# Patient Record
Sex: FEMALE | Race: WHITE | Employment: UNEMPLOYED | URBAN - METROPOLITAN AREA
[De-identification: names, ages, dates, MRNs, and addresses within clinical notes are randomized per-mention and may not be internally consistent; named-entity substitution may affect disease eponyms.]

---

## 2024-05-02 ENCOUNTER — HOSPITAL ENCOUNTER (OUTPATIENT)
Dept: WOUND CARE | Age: 64
Discharge: HOME | End: 2024-05-02

## 2024-05-02 VITALS
HEIGHT: 65 IN | RESPIRATION RATE: 18 BRPM | SYSTOLIC BLOOD PRESSURE: 153 MMHG | WEIGHT: 240 LBS | TEMPERATURE: 96.6 F | HEART RATE: 96 BPM | DIASTOLIC BLOOD PRESSURE: 79 MMHG | BODY MASS INDEX: 39.99 KG/M2

## 2024-05-02 DIAGNOSIS — T81.89XD NON-HEALING SURGICAL WOUND, SUBSEQUENT ENCOUNTER: Primary | ICD-10-CM

## 2024-05-02 DIAGNOSIS — E66.01 CLASS 2 SEVERE OBESITY DUE TO EXCESS CALORIES WITH SERIOUS COMORBIDITY AND BODY MASS INDEX (BMI) OF 39.0 TO 39.9 IN ADULT (HCC): ICD-10-CM

## 2024-05-02 DIAGNOSIS — I10 PRIMARY HYPERTENSION: ICD-10-CM

## 2024-05-02 DIAGNOSIS — E03.9 HYPOTHYROIDISM, UNSPECIFIED TYPE: ICD-10-CM

## 2024-05-02 PROBLEM — T81.89XA SURGICAL WOUND, NON HEALING: Status: ACTIVE | Noted: 2024-05-02

## 2024-05-02 PROCEDURE — 87205 SMEAR GRAM STAIN: CPT

## 2024-05-02 PROCEDURE — 11042 DBRDMT SUBQ TIS 1ST 20SQCM/<: CPT

## 2024-05-02 PROCEDURE — 87070 CULTURE OTHR SPECIMN AEROBIC: CPT

## 2024-05-02 PROCEDURE — 87077 CULTURE AEROBIC IDENTIFY: CPT

## 2024-05-02 PROCEDURE — 99213 OFFICE O/P EST LOW 20 MIN: CPT

## 2024-05-02 RX ORDER — VALSARTAN 320 MG/1
320 TABLET ORAL DAILY
COMMUNITY

## 2024-05-02 RX ORDER — SODIUM CHLOR/HYPOCHLOROUS ACID 0.033 %
SOLUTION, IRRIGATION IRRIGATION ONCE
OUTPATIENT
Start: 2024-05-02 | End: 2024-05-02

## 2024-05-02 RX ORDER — CLOBETASOL PROPIONATE 0.5 MG/G
OINTMENT TOPICAL ONCE
OUTPATIENT
Start: 2024-05-02 | End: 2024-05-02

## 2024-05-02 RX ORDER — IBUPROFEN 200 MG
TABLET ORAL ONCE
OUTPATIENT
Start: 2024-05-02 | End: 2024-05-02

## 2024-05-02 RX ORDER — GENTAMICIN SULFATE 1 MG/G
OINTMENT TOPICAL ONCE
OUTPATIENT
Start: 2024-05-02 | End: 2024-05-02

## 2024-05-02 RX ORDER — LIDOCAINE HYDROCHLORIDE 40 MG/ML
SOLUTION TOPICAL ONCE
OUTPATIENT
Start: 2024-05-02 | End: 2024-05-02

## 2024-05-02 RX ORDER — LIDOCAINE 50 MG/G
OINTMENT TOPICAL ONCE
OUTPATIENT
Start: 2024-05-02 | End: 2024-05-02

## 2024-05-02 RX ORDER — ASPIRIN 325 MG
325 TABLET ORAL DAILY
COMMUNITY

## 2024-05-02 RX ORDER — FAMOTIDINE 20 MG/1
20 TABLET, FILM COATED ORAL DAILY
COMMUNITY

## 2024-05-02 RX ORDER — LIDOCAINE 40 MG/G
CREAM TOPICAL ONCE
OUTPATIENT
Start: 2024-05-02 | End: 2024-05-02

## 2024-05-02 RX ORDER — TRIAMCINOLONE ACETONIDE 1 MG/G
OINTMENT TOPICAL ONCE
OUTPATIENT
Start: 2024-05-02 | End: 2024-05-02

## 2024-05-02 RX ORDER — LEVOTHYROXINE SODIUM 175 UG/1
175 TABLET ORAL DAILY
COMMUNITY

## 2024-05-02 RX ORDER — BETAMETHASONE DIPROPIONATE 0.5 MG/G
CREAM TOPICAL ONCE
OUTPATIENT
Start: 2024-05-02 | End: 2024-05-02

## 2024-05-02 RX ORDER — GINSENG 100 MG
CAPSULE ORAL ONCE
OUTPATIENT
Start: 2024-05-02 | End: 2024-05-02

## 2024-05-02 RX ORDER — BACITRACIN ZINC AND POLYMYXIN B SULFATE 500; 1000 [USP'U]/G; [USP'U]/G
OINTMENT TOPICAL ONCE
OUTPATIENT
Start: 2024-05-02 | End: 2024-05-02

## 2024-05-02 RX ORDER — CEPHALEXIN 500 MG/1
500 CAPSULE ORAL 4 TIMES DAILY
COMMUNITY

## 2024-05-02 RX ORDER — OMEPRAZOLE 20 MG/1
40 CAPSULE, DELAYED RELEASE ORAL DAILY
COMMUNITY

## 2024-05-02 NOTE — DISCHARGE INSTRUCTIONS
Visit Discharge/Physician Orders    Discharge condition: Stable  Assessment of pain at discharge: yes  Anesthetic used: lidocaine 4%  Discharge to: Home  Left via:Private automobile  Accompanied by: accompanied by aunt  ECF/HHA: [facility] -note new orders 5/2- please order one large excel SAP to cover both wounds for daily changes     Dressing Orders: Cleanse wound to Left hip and left thigh with normal saline, apply ALGINATE AG rope to wound bed and cover with EXCEL SAP, change daily.    Treatment Orders: 5/2 Culture taken  EAT DIET WITH PROTEINS AND VITAMIN C  TAKE A MULTIVITAMIN DAILY IF NOT CONTRAINDICATED      [facility] followup visit ______1 week_______________________  (Please note your next appointment above and if you are unable to keep, kindly give a 24 hour notice. Thank you.)    Physician signature:__________________________      If you experience any of the following, please call the Wound Care Center during business hours:    * Increase in Pain  * Temperature over 101  * Increase in drainage from your wound  * Drainage with a foul odor  * Bleeding  * Increase in swelling  * Need for compression bandage changes due to slippage, breakthrough drainage.    If you need medical attention outside of the business hours of the Wound Care Centers please contact your PCP or go to the nearest emergency room.

## 2024-05-02 NOTE — PROGRESS NOTES
Wound Healing Center /Hyperbarics   History and Physical/Consultation  General Surgery/Medicine    Referring Physician : [place], Jayesh FERGUSON MD  Jessica Shea  MEDICAL RECORD NUMBER:  [id number]  AGE: 64 y.o.   GENDER: female  : 1960  EPISODE DATE:  2024  Subjective:     Chief Complaint   Patient presents with    Wound Check     Left hip         HISTORY of PRESENT ILLNESS HPI     Jessica Shea is a 64 y.o. female who presents today for wound/ulcer evaluation.   History of Wound Context:  The patient has had a wound of nonhealing surgical wound which was first noted approximately 2024.  This has been treated jamar. On their initial visit to the wound healing center, 2024 ,  the patient has noted that the wound has not been improving.  The patient has not had similar previous wounds in the past.      Pt is on abx at time of initial visit.      2024  -counseled on diet, wound care   -all ?s answered  -full wt bearing, no issue according to pt, doing well in pt  -debridement, slough necrotic tissue. No apparent biofilm cx was sent  -alginate ag, no improvement this next week jamar (if home health done with pt at home) or vac  -no cardinal ss  -no constitutional ss      Wound/Ulcer Pain Timing/Severity: intermittent, waxing and waning  Quality of pain: throbbing  Severity:  mild  Modifying Factors: ambulating  Associated Signs/Symptoms: drainage and pain    Ulcer Identification:  Ulcer Type: non-healing surgical  Contributing Factors: decreased mobility and obesity        PAST MEDICAL HISTORY      Diagnosis Date    Hypertension      Past Surgical History:   Procedure Laterality Date    BACK SURGERY      BREAST SURGERY      JOINT REPLACEMENT      TONSILLECTOMY       History reviewed. No pertinent family history.  Social History     Tobacco Use    Smoking status: Never    Smokeless tobacco: Never   Substance Use Topics    Alcohol use: Not Currently    Drug use: Not Currently     Allergies

## 2024-05-05 LAB
MICROORGANISM SPEC CULT: ABNORMAL
MICROORGANISM SPEC CULT: ABNORMAL
MICROORGANISM/AGENT SPEC: ABNORMAL
SPECIMEN DESCRIPTION: ABNORMAL

## 2024-05-06 NOTE — DISCHARGE INSTRUCTIONS
Visit Discharge/Physician Orders     Discharge condition: Stable  Assessment of pain at discharge: yes  Anesthetic used: lidocaine 4%  Discharge to: Home  Left via:Private automobile  Accompanied by: accompanied by aunt  ECF/HHA: [facility] -note new orders 5/2- please order one large excel SAP to cover both wounds for daily changes      Dressing Orders: Cleanse wound to Left hip and left thigh with normal saline, apply ALGINATE AG rope to wound bed and cover with EXCEL SAP, change daily.    Approval for wound vac 5/9/24     When available - WHEN AVAILABLE: CLEANSE WOUND WITH NORMAL STERILE SALINE.  APPLY WOUND VAC FOLLOWING  MANUFACTURE GUIDELINES USING BLACK FOAM AND DRAPE WITH CONT SUCTION  @125MM for 5 minutes and  50 mm for 2 minutes.DRAPE, SPONGE TO BE CHANGED  THREE TIMES A WEEK .  CANISTER TO BE CHANGED WEEKLY AND PRN   MAY USE SKIN PREP, ADHESIVE REMOVER, THIN DUODERM TO PROTECT PERIWOUND  IF WOUND VAC FAILS, NOTIFY PHYSICIAN AND VAC COMPANY, REMOVE VAC AFTER 2 HOURS  AND APPLY: DRESSING TO ________: CLEANSE  WOUND WITH NORMAL STERILE SALINE, AND PLACE MOIST TO MOIST NSS ZULEIKA DRESSING AND SECURE WITH TAPE. CHANGE DAILY       Treatment Orders:   EAT DIET WITH PROTEINS AND VITAMIN C  TAKE A MULTIVITAMIN DAILY IF NOT CONTRAINDICATED       [facility] followup visit ______1 week_______________________  (Please note your next appointment above and if you are unable to keep, kindly give a 24 hour notice. Thank you.)     Physician signature:__________________________        If you experience any of the following, please call the Wound Care Center during business hours:     * Increase in Pain  * Temperature over 101  * Increase in drainage from your wound  * Drainage with a foul odor  * Bleeding  * Increase in swelling  * Need for compression bandage changes due to slippage, breakthrough drainage.     If you need medical attention outside of the business hours of the Wound Care Centers please contact your PCP or

## 2024-05-07 DIAGNOSIS — T14.8XXA WOUND INFECTION: Primary | ICD-10-CM

## 2024-05-07 DIAGNOSIS — L08.9 WOUND INFECTION: Primary | ICD-10-CM

## 2024-05-07 RX ORDER — SULFAMETHOXAZOLE AND TRIMETHOPRIM 800; 160 MG/1; MG/1
1 TABLET ORAL 2 TIMES DAILY
Qty: 20 TABLET | Refills: 0 | Status: SHIPPED | OUTPATIENT
Start: 2024-05-07 | End: 2024-05-17

## 2024-05-07 NOTE — PROGRESS NOTES
Spoke to patient regarding wound culture results, advised to finish Cephalexin and do not take Bactrim. Patient verbalized understanding.

## 2024-05-09 ENCOUNTER — HOSPITAL ENCOUNTER (OUTPATIENT)
Dept: WOUND CARE | Age: 64
Discharge: HOME | End: 2024-05-09

## 2024-05-09 VITALS
HEART RATE: 87 BPM | DIASTOLIC BLOOD PRESSURE: 74 MMHG | TEMPERATURE: 98.6 F | SYSTOLIC BLOOD PRESSURE: 150 MMHG | RESPIRATION RATE: 18 BRPM

## 2024-05-09 DIAGNOSIS — T81.89XD NON-HEALING SURGICAL WOUND, SUBSEQUENT ENCOUNTER: Primary | ICD-10-CM

## 2024-05-09 PROCEDURE — 11042 DBRDMT SUBQ TIS 1ST 20SQCM/<: CPT | Performed by: SURGERY

## 2024-05-09 PROCEDURE — 11042 DBRDMT SUBQ TIS 1ST 20SQCM/<: CPT

## 2024-05-09 RX ORDER — CLOBETASOL PROPIONATE 0.5 MG/G
OINTMENT TOPICAL ONCE
OUTPATIENT
Start: 2024-05-09 | End: 2024-05-09

## 2024-05-09 RX ORDER — IBUPROFEN 200 MG
TABLET ORAL ONCE
OUTPATIENT
Start: 2024-05-09 | End: 2024-05-09

## 2024-05-09 RX ORDER — GENTAMICIN SULFATE 1 MG/G
OINTMENT TOPICAL ONCE
OUTPATIENT
Start: 2024-05-09 | End: 2024-05-09

## 2024-05-09 RX ORDER — LIDOCAINE HYDROCHLORIDE 40 MG/ML
SOLUTION TOPICAL ONCE
Status: COMPLETED | OUTPATIENT
Start: 2024-05-09 | End: 2024-05-09

## 2024-05-09 RX ORDER — LIDOCAINE 50 MG/G
OINTMENT TOPICAL ONCE
OUTPATIENT
Start: 2024-05-09 | End: 2024-05-09

## 2024-05-09 RX ORDER — LIDOCAINE 40 MG/G
CREAM TOPICAL ONCE
OUTPATIENT
Start: 2024-05-09 | End: 2024-05-09

## 2024-05-09 RX ORDER — BETAMETHASONE DIPROPIONATE 0.5 MG/G
CREAM TOPICAL ONCE
OUTPATIENT
Start: 2024-05-09 | End: 2024-05-09

## 2024-05-09 RX ORDER — GINSENG 100 MG
CAPSULE ORAL ONCE
OUTPATIENT
Start: 2024-05-09 | End: 2024-05-09

## 2024-05-09 RX ORDER — TRIAMCINOLONE ACETONIDE 1 MG/G
OINTMENT TOPICAL ONCE
OUTPATIENT
Start: 2024-05-09 | End: 2024-05-09

## 2024-05-09 RX ORDER — SODIUM CHLOR/HYPOCHLOROUS ACID 0.033 %
SOLUTION, IRRIGATION IRRIGATION ONCE
OUTPATIENT
Start: 2024-05-09 | End: 2024-05-09

## 2024-05-09 RX ORDER — BACITRACIN ZINC AND POLYMYXIN B SULFATE 500; 1000 [USP'U]/G; [USP'U]/G
OINTMENT TOPICAL ONCE
OUTPATIENT
Start: 2024-05-09 | End: 2024-05-09

## 2024-05-09 RX ORDER — CEPHALEXIN 500 MG/1
500 CAPSULE ORAL 4 TIMES DAILY
Qty: 28 CAPSULE | Refills: 0 | Status: SHIPPED | OUTPATIENT
Start: 2024-05-09 | End: 2024-05-16

## 2024-05-09 RX ORDER — LIDOCAINE HYDROCHLORIDE 40 MG/ML
SOLUTION TOPICAL ONCE
OUTPATIENT
Start: 2024-05-09 | End: 2024-05-09

## 2024-05-09 RX ADMIN — LIDOCAINE HYDROCHLORIDE 10 ML: 40 SOLUTION TOPICAL at 09:07

## 2024-05-09 NOTE — PROGRESS NOTES
Wound Healing Center Followup Visit Note    Referring Physician : [place], Jayesh FERGUSON MD  Jessica Shea  MEDICAL RECORD NUMBER:  [id number]  AGE: 64 y.o.   GENDER: female  : 1960  EPISODE DATE:  2024    Subjective:     Chief Complaint   Patient presents with    Wound Check     hip      HISTORY of PRESENT ILLNESS HPI   Jessica Shea is a 64 y.o. female who presents today in regards to follow up evaluation and treatment of wound/ulcer.  That patient's past medical, family and social hx were reviewed and changes were made if present.    History of Wound Context:  The patient has had a wound of nonhealing surgical wound which was first noted approximately 2024.  This has been treated jamar. On their initial visit to the wound healing center, 2024 ,  the patient has noted that the wound has not been improving.  The patient has not had similar previous wounds in the past.      Pt is on abx at time of initial visit.      2024  -counseled on diet, wound care   -all ?s answered  -full wt bearing, no issue according to pt, doing well in pt  -debridement, slough necrotic tissue. No apparent biofilm cx was sent  -alginate ag, no improvement this next week jamar (if home health done with pt at home) or vac  -no cardinal ss  -no constitutional ss    2024  -depth improved but surface area worsened slightly  -home health not packing cranial wound reportedly, it is 1cm in depth with .5mm width by .3mm width after debridement - it is certainly a wound that can/should be packed  -get approval for renasys vac 125mmHg 5 min and 50mmHg 2 min  -cx reviewed she was on appropriate abx, extend the course  -no cardinal ss  -no constitutional ss    Wound/Ulcer Pain Timing/Severity: intermittent, waxing and waning  Quality of pain: throbbing  Severity:  mild  Modifying Factors: ambulating  Associated Signs/Symptoms: drainage and pain    Ulcer Identification:  Ulcer Type: non-healing surgical  Contributing Factors:

## 2024-05-13 NOTE — DISCHARGE INSTRUCTIONS
Visit Discharge/Physician Orders     Discharge condition: Stable  Assessment of pain at discharge: yes  Anesthetic used: lidocaine 4%  Discharge to: Home  Left via:Private automobile  Accompanied by: accompanied by aunt  ECF/HHA: [facility] -note new orders 5/2- please order one large excel SAP to cover both wounds for daily changes      Dressing Orders: Cleanse wound to Left hip and left thigh with normal saline, apply ALGINATE AG rope to wound bed and cover with EXCEL SAP, change daily.     Approval for wound vac 5/9/24      When available - WHEN AVAILABLE: CLEANSE WOUND WITH NORMAL STERILE SALINE.  APPLY WOUND VAC FOLLOWING  MANUFACTURE GUIDELINES USING BLACK FOAM AND DRAPE WITH CONT SUCTION  @125MM for 5 minutes and  50 mm for 2 minutes.DRAPE, SPONGE TO BE CHANGED  THREE TIMES A WEEK .  CANISTER TO BE CHANGED WEEKLY AND PRN   MAY USE SKIN PREP, ADHESIVE REMOVER, THIN DUODERM TO PROTECT PERIWOUND  IF WOUND VAC FAILS, NOTIFY PHYSICIAN AND VAC COMPANY, REMOVE VAC AFTER 2 HOURS  AND APPLY: DRESSING TO left hip: CLEANSE  WOUND WITH NORMAL STERILE SALINE, AND PLACE MOIST TO MOIST NSS ZULEIKA DRESSING AND SECURE WITH TAPE. CHANGE DAILY        Treatment Orders:   EAT DIET WITH PROTEINS AND VITAMIN C  TAKE A MULTIVITAMIN DAILY IF NOT CONTRAINDICATED       [facility] followup visit ______1 week_______________________  (Please note your next appointment above and if you are unable to keep, kindly give a 24 hour notice. Thank you.)     Physician signature:__________________________        If you experience any of the following, please call the Wound Care Center during business hours:     * Increase in Pain  * Temperature over 101  * Increase in drainage from your wound  * Drainage with a foul odor  * Bleeding  * Increase in swelling  * Need for compression bandage changes due to slippage, breakthrough drainage.     If you need medical attention outside of the business hours of the Wound Care Centers please contact your PCP or

## 2024-05-16 ENCOUNTER — HOSPITAL ENCOUNTER (OUTPATIENT)
Dept: WOUND CARE | Age: 64
Discharge: HOME | End: 2024-05-16
Payer: COMMERCIAL

## 2024-05-16 VITALS
DIASTOLIC BLOOD PRESSURE: 68 MMHG | SYSTOLIC BLOOD PRESSURE: 146 MMHG | HEART RATE: 80 BPM | BODY MASS INDEX: 39.99 KG/M2 | RESPIRATION RATE: 20 BRPM | HEIGHT: 65 IN | WEIGHT: 240 LBS | TEMPERATURE: 96.9 F

## 2024-05-16 DIAGNOSIS — T81.89XD NON-HEALING SURGICAL WOUND, SUBSEQUENT ENCOUNTER: Primary | ICD-10-CM

## 2024-05-16 PROCEDURE — 11042 DBRDMT SUBQ TIS 1ST 20SQCM/<: CPT

## 2024-05-16 RX ORDER — IBUPROFEN 200 MG
TABLET ORAL ONCE
OUTPATIENT
Start: 2024-05-16 | End: 2024-05-16

## 2024-05-16 RX ORDER — LIDOCAINE 50 MG/G
OINTMENT TOPICAL ONCE
OUTPATIENT
Start: 2024-05-16 | End: 2024-05-16

## 2024-05-16 RX ORDER — GENTAMICIN SULFATE 1 MG/G
OINTMENT TOPICAL ONCE
OUTPATIENT
Start: 2024-05-16 | End: 2024-05-16

## 2024-05-16 RX ORDER — GINSENG 100 MG
CAPSULE ORAL ONCE
OUTPATIENT
Start: 2024-05-16 | End: 2024-05-16

## 2024-05-16 RX ORDER — LIDOCAINE HYDROCHLORIDE 40 MG/ML
SOLUTION TOPICAL ONCE
OUTPATIENT
Start: 2024-05-16 | End: 2024-05-16

## 2024-05-16 RX ORDER — LIDOCAINE 40 MG/G
CREAM TOPICAL ONCE
OUTPATIENT
Start: 2024-05-16 | End: 2024-05-16

## 2024-05-16 RX ORDER — BETAMETHASONE DIPROPIONATE 0.5 MG/G
CREAM TOPICAL ONCE
OUTPATIENT
Start: 2024-05-16 | End: 2024-05-16

## 2024-05-16 RX ORDER — LIDOCAINE HYDROCHLORIDE 40 MG/ML
SOLUTION TOPICAL ONCE
Status: COMPLETED | OUTPATIENT
Start: 2024-05-16 | End: 2024-05-16

## 2024-05-16 RX ORDER — SODIUM CHLOR/HYPOCHLOROUS ACID 0.033 %
SOLUTION, IRRIGATION IRRIGATION ONCE
OUTPATIENT
Start: 2024-05-16 | End: 2024-05-16

## 2024-05-16 RX ORDER — BACITRACIN ZINC AND POLYMYXIN B SULFATE 500; 1000 [USP'U]/G; [USP'U]/G
OINTMENT TOPICAL ONCE
OUTPATIENT
Start: 2024-05-16 | End: 2024-05-16

## 2024-05-16 RX ORDER — TRIAMCINOLONE ACETONIDE 1 MG/G
OINTMENT TOPICAL ONCE
OUTPATIENT
Start: 2024-05-16 | End: 2024-05-16

## 2024-05-16 RX ORDER — CLOBETASOL PROPIONATE 0.5 MG/G
OINTMENT TOPICAL ONCE
OUTPATIENT
Start: 2024-05-16 | End: 2024-05-16

## 2024-05-16 RX ADMIN — LIDOCAINE HYDROCHLORIDE 5 ML: 40 SOLUTION TOPICAL at 08:19

## 2024-05-16 ASSESSMENT — PAIN SCALES - GENERAL: PAINLEVEL_OUTOF10: 0

## 2024-05-16 NOTE — PROGRESS NOTES
)      Wound/Ulcer #: 2    Percent of Wound/Ulcer Debrided: 100%    Total Surface Area Debrided:  0.06sq cm (from 0.09 sq cm )    Estimated Blood Loss:  Minimal  Hemostasis Achieved:  by pressure    Procedural Pain:  0  / 10   Post Procedural Pain:  0 / 10     Response to treatment:  Well tolerated by patient.     Plan:   Treatment Note please see attached Discharge Instructions    Written patient dismissal instructions given to patient and signed by patient or POA.         Discharge Instructions         Visit Discharge/Physician Orders     Discharge condition: Stable  Assessment of pain at discharge: yes  Anesthetic used: lidocaine 4%  Discharge to: Home  Left via:Private automobile  Accompanied by: accompanied by aunt  ECF/HHA: [facility] -note new orders 5/2- please order one large excel SAP to cover both wounds for daily changes      Dressing Orders: Cleanse wound to Left hip and left thigh with normal saline, apply ALGINATE AG rope to wound bed and cover with EXCEL SAP, change daily.     Approval for wound vac 5/9/24      When available - WHEN AVAILABLE: CLEANSE WOUND WITH NORMAL STERILE SALINE.  APPLY WOUND VAC FOLLOWING  MANUFACTURE GUIDELINES USING BLACK FOAM AND DRAPE WITH CONT SUCTION  @125MM for 5 minutes and  50 mm for 2 minutes.DRAPE, SPONGE TO BE CHANGED  THREE TIMES A WEEK .  CANISTER TO BE CHANGED WEEKLY AND PRN   MAY USE SKIN PREP, ADHESIVE REMOVER, THIN DUODERM TO PROTECT PERIWOUND  IF WOUND VAC FAILS, NOTIFY PHYSICIAN AND VAC COMPANY, REMOVE VAC AFTER 2 HOURS  AND APPLY: DRESSING TO left hip: CLEANSE  WOUND WITH NORMAL STERILE SALINE, AND PLACE MOIST TO MOIST NSS ZULEIKA DRESSING AND SECURE WITH TAPE. CHANGE DAILY        Treatment Orders:   EAT DIET WITH PROTEINS AND VITAMIN C  TAKE A MULTIVITAMIN DAILY IF NOT CONTRAINDICATED       [facility] followup visit ______1 week_______________________  (Please note your next appointment above and if you are unable to keep, kindly give a 24 hour notice. Thank

## 2024-05-20 NOTE — DISCHARGE INSTRUCTIONS
Visit Discharge/Physician Orders     Discharge condition: Stable  Assessment of pain at discharge: yes  Anesthetic used: lidocaine 4%  Discharge to: Home  Left via:Private automobile  Accompanied by: accompanied by aunt  ECF/HHA: [facility] -note new orders 5/2- please order one large excel SAP to cover both wounds for daily changes      Dressing Orders: Cleanse wound to Left hip and left thigh with normal saline, apply ALGINATE AG rope to wound bed and cover with EXCEL SAP, change daily.        When available - WHEN AVAILABLE: CLEANSE WOUND WITH NORMAL STERILE SALINE.  APPLY WOUND VAC FOLLOWING  MANUFACTURE GUIDELINES USING BLACK FOAM AND DRAPE WITH CONT SUCTION  @125MM for 5 minutes and  50 mm for 2 minutes.DRAPE, SPONGE TO BE CHANGED  THREE TIMES A WEEK .  CANISTER TO BE CHANGED WEEKLY AND PRN   MAY USE SKIN PREP, ADHESIVE REMOVER, THIN DUODERM TO PROTECT PERIWOUND  IF WOUND VAC FAILS, NOTIFY PHYSICIAN AND VAC COMPANY, REMOVE VAC AFTER 2 HOURS  AND APPLY: DRESSING TO left hip: CLEANSE  WOUND WITH NORMAL STERILE SALINE, AND PLACE MOIST TO MOIST NSS ZULEIKA DRESSING AND SECURE WITH TAPE. CHANGE DAILY    Make sure to pack the areas and bridge wounds.  Thigh has a depth of 2.1        Treatment Orders:   EAT DIET WITH PROTEINS AND VITAMIN C  TAKE A MULTIVITAMIN DAILY IF NOT CONTRAINDICATED       [facility] followup visit ______1 week_______________________  (Please note your next appointment above and if you are unable to keep, kindly give a 24 hour notice. Thank you.)     Physician signature:__________________________        If you experience any of the following, please call the Wound Care Center during business hours:     * Increase in Pain  * Temperature over 101  * Increase in drainage from your wound  * Drainage with a foul odor  * Bleeding  * Increase in swelling  * Need for compression bandage changes due to slippage, breakthrough drainage.     If you need medical attention outside of the business hours of the

## 2024-05-23 ENCOUNTER — HOSPITAL ENCOUNTER (OUTPATIENT)
Dept: WOUND CARE | Age: 64
Discharge: HOME | End: 2024-05-23
Payer: COMMERCIAL

## 2024-05-23 VITALS
HEART RATE: 78 BPM | HEIGHT: 65 IN | DIASTOLIC BLOOD PRESSURE: 80 MMHG | WEIGHT: 240 LBS | SYSTOLIC BLOOD PRESSURE: 156 MMHG | RESPIRATION RATE: 18 BRPM | BODY MASS INDEX: 39.99 KG/M2 | TEMPERATURE: 97.7 F

## 2024-05-23 DIAGNOSIS — T81.89XD NON-HEALING SURGICAL WOUND, SUBSEQUENT ENCOUNTER: Primary | ICD-10-CM

## 2024-05-23 PROCEDURE — 11042 DBRDMT SUBQ TIS 1ST 20SQCM/<: CPT

## 2024-05-23 RX ORDER — GINSENG 100 MG
CAPSULE ORAL ONCE
OUTPATIENT
Start: 2024-05-23 | End: 2024-05-23

## 2024-05-23 RX ORDER — IBUPROFEN 200 MG
TABLET ORAL ONCE
OUTPATIENT
Start: 2024-05-23 | End: 2024-05-23

## 2024-05-23 RX ORDER — LIDOCAINE 40 MG/G
CREAM TOPICAL ONCE
OUTPATIENT
Start: 2024-05-23 | End: 2024-05-23

## 2024-05-23 RX ORDER — CLOBETASOL PROPIONATE 0.5 MG/G
OINTMENT TOPICAL ONCE
OUTPATIENT
Start: 2024-05-23 | End: 2024-05-23

## 2024-05-23 RX ORDER — LIDOCAINE HYDROCHLORIDE 40 MG/ML
SOLUTION TOPICAL ONCE
Status: COMPLETED | OUTPATIENT
Start: 2024-05-23 | End: 2024-05-23

## 2024-05-23 RX ORDER — GENTAMICIN SULFATE 1 MG/G
OINTMENT TOPICAL ONCE
OUTPATIENT
Start: 2024-05-23 | End: 2024-05-23

## 2024-05-23 RX ORDER — LIDOCAINE 50 MG/G
OINTMENT TOPICAL ONCE
OUTPATIENT
Start: 2024-05-23 | End: 2024-05-23

## 2024-05-23 RX ORDER — BACITRACIN ZINC AND POLYMYXIN B SULFATE 500; 1000 [USP'U]/G; [USP'U]/G
OINTMENT TOPICAL ONCE
OUTPATIENT
Start: 2024-05-23 | End: 2024-05-23

## 2024-05-23 RX ORDER — SODIUM CHLOR/HYPOCHLOROUS ACID 0.033 %
SOLUTION, IRRIGATION IRRIGATION ONCE
OUTPATIENT
Start: 2024-05-23 | End: 2024-05-23

## 2024-05-23 RX ORDER — BETAMETHASONE DIPROPIONATE 0.5 MG/G
CREAM TOPICAL ONCE
OUTPATIENT
Start: 2024-05-23 | End: 2024-05-23

## 2024-05-23 RX ORDER — TRIAMCINOLONE ACETONIDE 1 MG/G
OINTMENT TOPICAL ONCE
OUTPATIENT
Start: 2024-05-23 | End: 2024-05-23

## 2024-05-23 RX ORDER — LIDOCAINE HYDROCHLORIDE 40 MG/ML
SOLUTION TOPICAL ONCE
OUTPATIENT
Start: 2024-05-23 | End: 2024-05-23

## 2024-05-23 RX ADMIN — LIDOCAINE HYDROCHLORIDE 10 ML: 40 SOLUTION TOPICAL at 08:08

## 2024-05-23 ASSESSMENT — PAIN SCALES - GENERAL: PAINLEVEL_OUTOF10: 0

## 2024-05-23 NOTE — PROGRESS NOTES
Wound Healing Center Followup Visit Note    Referring Physician : [place], Jayesh FERGUSON MD  Jessica Shea  MEDICAL RECORD NUMBER:  [id number]  AGE: 64 y.o.   GENDER: female  : 1960  EPISODE DATE:  2024    Subjective:     Chief Complaint   Patient presents with    Wound Check     Left hip      HISTORY of PRESENT ILLNESS HPI   Jessica Shea is a 64 y.o. female who presents today in regards to follow up evaluation and treatment of wound/ulcer.  That patient's past medical, family and social hx were reviewed and changes were made if present.    History of Wound Context:  The patient has had a wound of nonhealing surgical wound which was first noted approximately 2024.  This has been treated jamar. On their initial visit to the wound healing center, 2024 ,  the patient has noted that the wound has not been improving.  The patient has not had similar previous wounds in the past.      Pt is on abx at time of initial visit.      2024  -counseled on diet, wound care   -all ?s answered  -full wt bearing, no issue according to pt, doing well in pt  -debridement, slough necrotic tissue. No apparent biofilm cx was sent  -alginate ag, no improvement this next week jamar (if home health done with pt at home) or vac  -no cardinal ss  -no constitutional ss    2024  -depth improved but surface area worsened slightly  -home health not packing cranial wound reportedly, it is 1cm in depth with .5mm width by .3mm width after debridement - it is certainly a wound that can/should be packed  -get approval for renasys vac 125mmHg 5 min and 50mmHg 2 min  -cx reviewed she was on appropriate abx, extend the course  -no cardinal ss  -no constitutional ss    2024  -overall, cranial and caudal wounds healing nicely  -caudal wound surface area appears worse but depth improved, healthy budding granulation tissue is present  -insurance issue with vac we are working through now  -no cardinal ss  -no constitutional

## 2024-05-28 NOTE — DISCHARGE INSTRUCTIONS
Visit Discharge/Physician Orders     Discharge condition: Stable  Assessment of pain at discharge: yes  Anesthetic used: lidocaine 4%  Discharge to: Home  Left via:Private automobile  Accompanied by: accompanied by aunt  ECF/HHA: [facility] -note new orders 5/2- please order one large excel SAP to cover both wounds for daily changes      Dressing Orders: Cleanse wound to Left hip and left thigh with normal saline, apply ALGINATE AG rope to wound bed and cover with EXCEL SAP, change daily.        When available - WHEN AVAILABLE: CLEANSE WOUND WITH NORMAL STERILE SALINE.  APPLY WOUND VAC FOLLOWING  MANUFACTURE GUIDELINES USING BLACK FOAM AND DRAPE WITH CONT SUCTION  @125MM for 5 minutes and  50 mm for 2 minutes.DRAPE, SPONGE TO BE CHANGED  THREE TIMES A WEEK .  CANISTER TO BE CHANGED WEEKLY AND PRN   MAY USE SKIN PREP, ADHESIVE REMOVER, THIN DUODERM TO PROTECT PERIWOUND  IF WOUND VAC FAILS, NOTIFY PHYSICIAN AND VAC COMPANY, REMOVE VAC AFTER 2 HOURS  AND APPLY: DRESSING TO left hip: CLEANSE  WOUND WITH NORMAL STERILE SALINE, AND PLACE MOIST TO MOIST NSS ZULEIKA DRESSING AND SECURE WITH TAPE. CHANGE DAILY     Make sure to pack the areas and bridge wounds.  Thigh has a depth of 2.1        Treatment Orders:   EAT DIET WITH PROTEINS AND VITAMIN C  TAKE A MULTIVITAMIN DAILY IF NOT CONTRAINDICATED       [facility] followup visit ______1 week_______________________  (Please note your next appointment above and if you are unable to keep, kindly give a 24 hour notice. Thank you.)     Physician signature:__________________________        If you experience any of the following, please call the Wound Care Center during business hours:     * Increase in Pain  * Temperature over 101  * Increase in drainage from your wound  * Drainage with a foul odor  * Bleeding  * Increase in swelling  * Need for compression bandage changes due to slippage, breakthrough drainage.     If you need medical attention outside of the business hours

## 2024-05-30 ENCOUNTER — HOSPITAL ENCOUNTER (OUTPATIENT)
Dept: WOUND CARE | Age: 64
Discharge: HOME | End: 2024-05-30
Payer: COMMERCIAL

## 2024-05-30 VITALS
RESPIRATION RATE: 18 BRPM | HEART RATE: 89 BPM | SYSTOLIC BLOOD PRESSURE: 145 MMHG | TEMPERATURE: 97 F | DIASTOLIC BLOOD PRESSURE: 88 MMHG

## 2024-05-30 DIAGNOSIS — T81.89XD NON-HEALING SURGICAL WOUND, SUBSEQUENT ENCOUNTER: Primary | ICD-10-CM

## 2024-05-30 PROCEDURE — 11042 DBRDMT SUBQ TIS 1ST 20SQCM/<: CPT | Performed by: SURGERY

## 2024-05-30 PROCEDURE — 11042 DBRDMT SUBQ TIS 1ST 20SQCM/<: CPT

## 2024-05-30 RX ORDER — TRIAMCINOLONE ACETONIDE 1 MG/G
OINTMENT TOPICAL ONCE
OUTPATIENT
Start: 2024-05-30 | End: 2024-05-30

## 2024-05-30 RX ORDER — LIDOCAINE 50 MG/G
OINTMENT TOPICAL ONCE
OUTPATIENT
Start: 2024-05-30 | End: 2024-05-30

## 2024-05-30 RX ORDER — IBUPROFEN 200 MG
TABLET ORAL ONCE
OUTPATIENT
Start: 2024-05-30 | End: 2024-05-30

## 2024-05-30 RX ORDER — CLOBETASOL PROPIONATE 0.5 MG/G
OINTMENT TOPICAL ONCE
OUTPATIENT
Start: 2024-05-30 | End: 2024-05-30

## 2024-05-30 RX ORDER — BETAMETHASONE DIPROPIONATE 0.5 MG/G
CREAM TOPICAL ONCE
OUTPATIENT
Start: 2024-05-30 | End: 2024-05-30

## 2024-05-30 RX ORDER — LIDOCAINE 40 MG/G
CREAM TOPICAL ONCE
OUTPATIENT
Start: 2024-05-30 | End: 2024-05-30

## 2024-05-30 RX ORDER — SODIUM CHLOR/HYPOCHLOROUS ACID 0.033 %
SOLUTION, IRRIGATION IRRIGATION ONCE
OUTPATIENT
Start: 2024-05-30 | End: 2024-05-30

## 2024-05-30 RX ORDER — M-VIT,TX,IRON,MINS/CALC/FOLIC 27MG-0.4MG
1 TABLET ORAL DAILY
COMMUNITY

## 2024-05-30 RX ORDER — GENTAMICIN SULFATE 1 MG/G
OINTMENT TOPICAL ONCE
OUTPATIENT
Start: 2024-05-30 | End: 2024-05-30

## 2024-05-30 RX ORDER — LIDOCAINE HYDROCHLORIDE 40 MG/ML
SOLUTION TOPICAL ONCE
Status: COMPLETED | OUTPATIENT
Start: 2024-05-30 | End: 2024-05-30

## 2024-05-30 RX ORDER — BACITRACIN ZINC AND POLYMYXIN B SULFATE 500; 1000 [USP'U]/G; [USP'U]/G
OINTMENT TOPICAL ONCE
OUTPATIENT
Start: 2024-05-30 | End: 2024-05-30

## 2024-05-30 RX ORDER — GINSENG 100 MG
CAPSULE ORAL ONCE
OUTPATIENT
Start: 2024-05-30 | End: 2024-05-30

## 2024-05-30 RX ORDER — LIDOCAINE HYDROCHLORIDE 40 MG/ML
SOLUTION TOPICAL ONCE
OUTPATIENT
Start: 2024-05-30 | End: 2024-05-30

## 2024-05-30 RX ADMIN — LIDOCAINE HYDROCHLORIDE 10 ML: 40 SOLUTION TOPICAL at 08:15

## 2024-05-30 NOTE — PROGRESS NOTES
Wound Healing Center Followup Visit Note    Referring Physician : [place], Jayesh FERGUSON MD  Jessica Shea  MEDICAL RECORD NUMBER:  [id number]  AGE: 64 y.o.   GENDER: female  : 1960  EPISODE DATE:  2024    Subjective:     Chief Complaint   Patient presents with    Wound Check     Left thigh and left hip wound      HISTORY of PRESENT ILLNESS HPI   Jessica Shea is a 64 y.o. female who presents today in regards to follow up evaluation and treatment of wound/ulcer.  That patient's past medical, family and social hx were reviewed and changes were made if present.    History of Wound Context:  The patient has had a wound of nonhealing surgical wound which was first noted approximately 2024.  This has been treated jamar. On their initial visit to the wound healing center, 2024 ,  the patient has noted that the wound has not been improving.  The patient has not had similar previous wounds in the past.      Pt is on abx at time of initial visit.      2024  -counseled on diet, wound care   -all ?s answered  -full wt bearing, no issue according to pt, doing well in pt  -debridement, slough necrotic tissue. No apparent biofilm cx was sent  -alginate ag, no improvement this next week jamar (if home health done with pt at home) or vac  -no cardinal ss  -no constitutional ss    2024  -depth improved but surface area worsened slightly  -home health not packing cranial wound reportedly, it is 1cm in depth with .5mm width by .3mm width after debridement - it is certainly a wound that can/should be packed  -get approval for renasys vac 125mmHg 5 min and 50mmHg 2 min  -cx reviewed she was on appropriate abx, extend the course  -no cardinal ss  -no constitutional ss    2024  -overall, cranial and caudal wounds healing nicely  -caudal wound surface area appears worse but depth improved, healthy budding granulation tissue is present  -insurance issue with vac we are working through now  -no cardinal

## 2024-06-04 NOTE — DISCHARGE INSTRUCTIONS
Visit Discharge/Physician Orders     Discharge condition: Stable  Assessment of pain at discharge: yes  Anesthetic used: lidocaine 4%  Discharge to: Home  Left via:Private automobile  Accompanied by: accompanied by aunt  ECF/HHA: [facility]     Dressing Orders: IN CLINIC _ Cleanse wound to Left hip and left thigh with normal saline, apply ALGINATE AG rope to wound bed and cover with EXCEL SAP, change daily.        Home Health : CLEANSE WOUND WITH NORMAL STERILE SALINE.  APPLY WOUND VAC FOLLOWING  MANUFACTURE GUIDELINES USING BLACK FOAM AND DRAPE WITH CONT SUCTION  @125MM for 5 minutes and  50 mm for 2 minutes.DRAPE, SPONGE TO BE CHANGED  THREE TIMES A WEEK .  CANISTER TO BE CHANGED WEEKLY AND PRN   MAY USE SKIN PREP, ADHESIVE REMOVER, THIN DUODERM TO PROTECT PERIWOUND  IF WOUND VAC FAILS, NOTIFY PHYSICIAN AND VAC COMPANY, REMOVE VAC AFTER 2 HOURS  AND APPLY: DRESSING TO left hip: CLEANSE  WOUND WITH NORMAL STERILE SALINE, AND PLACE MOIST TO MOIST NSS ZULEIKA DRESSING AND SECURE WITH TAPE. CHANGE DAILY     Make sure to pack the areas and bridge wounds.  Thigh has a depth of  0.9        Treatment Orders:   EAT DIET WITH PROTEINS AND VITAMIN C  TAKE A MULTIVITAMIN DAILY IF NOT CONTRAINDICATED       [facility] followup visit ______1 week_______________________  (Please note your next appointment above and if you are unable to keep, kindly give a 24 hour notice. Thank you.)     Physician signature:__________________________        If you experience any of the following, please call the Wound Care Center during business hours:     * Increase in Pain  * Temperature over 101  * Increase in drainage from your wound  * Drainage with a foul odor  * Bleeding  * Increase in swelling  * Need for compression bandage changes due to slippage, breakthrough drainage.     If you need medical attention outside of the business hours of the Wound Care Centers please contact your PCP or go to the nearest emergency room.

## 2024-06-06 ENCOUNTER — HOSPITAL ENCOUNTER (OUTPATIENT)
Dept: WOUND CARE | Age: 64
Discharge: HOME | End: 2024-06-06
Payer: COMMERCIAL

## 2024-06-06 VITALS
HEIGHT: 65 IN | TEMPERATURE: 97 F | HEART RATE: 84 BPM | DIASTOLIC BLOOD PRESSURE: 66 MMHG | WEIGHT: 240 LBS | RESPIRATION RATE: 18 BRPM | SYSTOLIC BLOOD PRESSURE: 148 MMHG | BODY MASS INDEX: 39.99 KG/M2

## 2024-06-06 DIAGNOSIS — T81.89XD NON-HEALING SURGICAL WOUND, SUBSEQUENT ENCOUNTER: Primary | ICD-10-CM

## 2024-06-06 PROCEDURE — 11042 DBRDMT SUBQ TIS 1ST 20SQCM/<: CPT

## 2024-06-06 RX ORDER — TRIAMCINOLONE ACETONIDE 1 MG/G
OINTMENT TOPICAL ONCE
OUTPATIENT
Start: 2024-06-06 | End: 2024-06-06

## 2024-06-06 RX ORDER — LIDOCAINE HYDROCHLORIDE 40 MG/ML
SOLUTION TOPICAL ONCE
OUTPATIENT
Start: 2024-06-06 | End: 2024-06-06

## 2024-06-06 RX ORDER — GINSENG 100 MG
CAPSULE ORAL ONCE
OUTPATIENT
Start: 2024-06-06 | End: 2024-06-06

## 2024-06-06 RX ORDER — SODIUM CHLOR/HYPOCHLOROUS ACID 0.033 %
SOLUTION, IRRIGATION IRRIGATION ONCE
OUTPATIENT
Start: 2024-06-06 | End: 2024-06-06

## 2024-06-06 RX ORDER — CLOBETASOL PROPIONATE 0.5 MG/G
OINTMENT TOPICAL ONCE
OUTPATIENT
Start: 2024-06-06 | End: 2024-06-06

## 2024-06-06 RX ORDER — IBUPROFEN 200 MG
TABLET ORAL ONCE
OUTPATIENT
Start: 2024-06-06 | End: 2024-06-06

## 2024-06-06 RX ORDER — GENTAMICIN SULFATE 1 MG/G
OINTMENT TOPICAL ONCE
OUTPATIENT
Start: 2024-06-06 | End: 2024-06-06

## 2024-06-06 RX ORDER — LIDOCAINE HYDROCHLORIDE 40 MG/ML
SOLUTION TOPICAL ONCE
Status: COMPLETED | OUTPATIENT
Start: 2024-06-06 | End: 2024-06-06

## 2024-06-06 RX ORDER — LIDOCAINE 50 MG/G
OINTMENT TOPICAL ONCE
OUTPATIENT
Start: 2024-06-06 | End: 2024-06-06

## 2024-06-06 RX ORDER — LIDOCAINE 40 MG/G
CREAM TOPICAL ONCE
OUTPATIENT
Start: 2024-06-06 | End: 2024-06-06

## 2024-06-06 RX ORDER — BETAMETHASONE DIPROPIONATE 0.5 MG/G
CREAM TOPICAL ONCE
OUTPATIENT
Start: 2024-06-06 | End: 2024-06-06

## 2024-06-06 RX ORDER — BACITRACIN ZINC AND POLYMYXIN B SULFATE 500; 1000 [USP'U]/G; [USP'U]/G
OINTMENT TOPICAL ONCE
OUTPATIENT
Start: 2024-06-06 | End: 2024-06-06

## 2024-06-06 RX ADMIN — LIDOCAINE HYDROCHLORIDE 10 ML: 40 SOLUTION TOPICAL at 08:27

## 2024-06-06 NOTE — PROGRESS NOTES
Wound Healing Center Followup Visit Note    Referring Physician : [place], Jayesh FERGUSON MD  Jessica Shea  MEDICAL RECORD NUMBER:  [id number]  AGE: 64 y.o.   GENDER: female  : 1960  EPISODE DATE:  2024    Subjective:     Chief Complaint   Patient presents with    Wound Check     Left hip and thigh      HISTORY of PRESENT ILLNESS HPI   Jessica Shea is a 64 y.o. female who presents today in regards to follow up evaluation and treatment of wound/ulcer.  That patient's past medical, family and social hx were reviewed and changes were made if present.    History of Wound Context:  The patient has had a wound of nonhealing surgical wound which was first noted approximately 2024.  This has been treated jamar. On their initial visit to the wound healing center, 2024 ,  the patient has noted that the wound has not been improving.  The patient has not had similar previous wounds in the past.      Pt is on abx at time of initial visit.      2024  -counseled on diet, wound care   -all ?s answered  -full wt bearing, no issue according to pt, doing well in pt  -debridement, slough necrotic tissue. No apparent biofilm cx was sent  -alginate ag, no improvement this next week jamar (if home health done with pt at home) or vac  -no cardinal ss  -no constitutional ss    2024  -depth improved but surface area worsened slightly  -home health not packing cranial wound reportedly, it is 1cm in depth with .5mm width by .3mm width after debridement - it is certainly a wound that can/should be packed  -get approval for renasys vac 125mmHg 5 min and 50mmHg 2 min  -cx reviewed she was on appropriate abx, extend the course  -no cardinal ss  -no constitutional ss    2024  -overall, cranial and caudal wounds healing nicely  -caudal wound surface area appears worse but depth improved, healthy budding granulation tissue is present  -insurance issue with vac we are working through now  -no cardinal ss  -no

## 2024-06-10 NOTE — DISCHARGE INSTRUCTIONS
Visit Discharge/Physician Orders     Discharge condition: Stable  Assessment of pain at discharge: yes  Anesthetic used: lidocaine 4%  Discharge to: Home  Left via:Private automobile  Accompanied by: accompanied by aunt  ECF/HHA: [facility] - NEW ORDERS     Dressing Orders:  Cleanse wound to Left hip and left thigh with normal saline, apply ALGINATE AG  to wound bed and cover with EXCEL SAP, change daily.           Treatment Orders:   EAT DIET WITH PROTEINS AND VITAMIN C  TAKE A MULTIVITAMIN DAILY IF NOT CONTRAINDICATED       [facility] followup visit ______1 week_______________________  (Please note your next appointment above and if you are unable to keep, kindly give a 24 hour notice. Thank you.)     Physician signature:__________________________        If you experience any of the following, please call the Wound Care Center during business hours:     * Increase in Pain  * Temperature over 101  * Increase in drainage from your wound  * Drainage with a foul odor  * Bleeding  * Increase in swelling  * Need for compression bandage changes due to slippage, breakthrough drainage.     If you need medical attention outside of the business hours of the Wound Care Centers please contact your PCP or go to the nearest emergency room.

## 2024-06-13 ENCOUNTER — HOSPITAL ENCOUNTER (OUTPATIENT)
Dept: WOUND CARE | Age: 64
Discharge: HOME | End: 2024-06-13
Payer: COMMERCIAL

## 2024-06-13 VITALS
TEMPERATURE: 95.2 F | HEIGHT: 65 IN | HEART RATE: 89 BPM | DIASTOLIC BLOOD PRESSURE: 80 MMHG | SYSTOLIC BLOOD PRESSURE: 176 MMHG | RESPIRATION RATE: 20 BRPM | WEIGHT: 240 LBS | BODY MASS INDEX: 39.99 KG/M2

## 2024-06-13 DIAGNOSIS — T81.89XD NON-HEALING SURGICAL WOUND, SUBSEQUENT ENCOUNTER: Primary | ICD-10-CM

## 2024-06-13 PROCEDURE — 11042 DBRDMT SUBQ TIS 1ST 20SQCM/<: CPT

## 2024-06-13 RX ORDER — BACITRACIN ZINC AND POLYMYXIN B SULFATE 500; 1000 [USP'U]/G; [USP'U]/G
OINTMENT TOPICAL ONCE
OUTPATIENT
Start: 2024-06-13 | End: 2024-06-13

## 2024-06-13 RX ORDER — LIDOCAINE 50 MG/G
OINTMENT TOPICAL ONCE
OUTPATIENT
Start: 2024-06-13 | End: 2024-06-13

## 2024-06-13 RX ORDER — GENTAMICIN SULFATE 1 MG/G
OINTMENT TOPICAL ONCE
OUTPATIENT
Start: 2024-06-13 | End: 2024-06-13

## 2024-06-13 RX ORDER — CLOBETASOL PROPIONATE 0.5 MG/G
OINTMENT TOPICAL ONCE
OUTPATIENT
Start: 2024-06-13 | End: 2024-06-13

## 2024-06-13 RX ORDER — IBUPROFEN 200 MG
TABLET ORAL ONCE
OUTPATIENT
Start: 2024-06-13 | End: 2024-06-13

## 2024-06-13 RX ORDER — LIDOCAINE HYDROCHLORIDE 40 MG/ML
SOLUTION TOPICAL ONCE
Status: COMPLETED | OUTPATIENT
Start: 2024-06-13 | End: 2024-06-13

## 2024-06-13 RX ORDER — LIDOCAINE HYDROCHLORIDE 40 MG/ML
SOLUTION TOPICAL ONCE
OUTPATIENT
Start: 2024-06-13 | End: 2024-06-13

## 2024-06-13 RX ORDER — TRIAMCINOLONE ACETONIDE 1 MG/G
OINTMENT TOPICAL ONCE
OUTPATIENT
Start: 2024-06-13 | End: 2024-06-13

## 2024-06-13 RX ORDER — LIDOCAINE 40 MG/G
CREAM TOPICAL ONCE
OUTPATIENT
Start: 2024-06-13 | End: 2024-06-13

## 2024-06-13 RX ORDER — BETAMETHASONE DIPROPIONATE 0.5 MG/G
CREAM TOPICAL ONCE
OUTPATIENT
Start: 2024-06-13 | End: 2024-06-13

## 2024-06-13 RX ORDER — GINSENG 100 MG
CAPSULE ORAL ONCE
OUTPATIENT
Start: 2024-06-13 | End: 2024-06-13

## 2024-06-13 RX ORDER — SODIUM CHLOR/HYPOCHLOROUS ACID 0.033 %
SOLUTION, IRRIGATION IRRIGATION ONCE
OUTPATIENT
Start: 2024-06-13 | End: 2024-06-13

## 2024-06-13 RX ADMIN — LIDOCAINE HYDROCHLORIDE 5 ML: 40 SOLUTION TOPICAL at 08:34

## 2024-06-13 NOTE — PROGRESS NOTES
Post-Procedure Width (cm) 1.1 cm 06/13/24 0909   Post-Procedure Depth (cm) 0.3 cm 06/13/24 0909   Post-Procedure Surface Area (cm^2) 0.77 cm^2 06/13/24 0909   Post-Procedure Volume (cm^3) 0.231 cm^3 06/13/24 0909   Undermining Starts ___ O'Clock 7 06/13/24 0827   Undermining Ends___ O'Clock 2 06/13/24 0827   Undermining Maxium Distance (cm) .2 @ 11 06/13/24 0827   Wound Assessment Bleeding;Granulation tissue 06/13/24 0827   Drainage Amount Small (< 25%) 06/13/24 0827   Drainage Description Serosanguinous 06/13/24 0827   Odor None 06/13/24 0827   Chary-wound Assessment Blanchable erythema 06/13/24 0827   Margins Attached edges 05/02/24 1015   Wound Thickness Description not for Pressure Injury Full thickness 05/02/24 1015   Number of days: 42          Procedure Note  Indications:  Based on my examination of this patient's wound(s)/ulcer(s) today, debridement is required to promote healing and evaluate the wound base.    Performed by: Jayesh Blanco DO    Consent obtained:  Yes    Time out taken:  Yes    Pain Control: Anesthetic  Anesthetic: 4% Lidocaine Liquid Topical     Debridement:Excisional Debridement    Using curette the wound(s)/ulcer(s) was/were sharply debrided down through and including the removal of subcutaneous tissue.        Devitalized Tissue Debrided:  fibrin, slough, and exudate to stimulate bleeding to promote healing, post debridement good bleeding base and wound edges noted    Wound/Ulcer #: 1    Percent of Wound/Ulcer Debrided: 100%    Total Surface Area Debrided:  1.2sq cm (from 1.96sq cm (from 2.1sq cm (from 2.7sq cm (from 3.91sq cm (from 3.4 sq cm ) ) ) )      Wound/Ulcer #: 2    Percent of Wound/Ulcer Debrided: 100%    Total Surface Area Debrided:  0.6sq cm (from  0.66sq cm (from 0.5sq cm (from 0.04sq cm (from 0.06sq cm (from 0.09 sq cm ) ) ) ) )    Estimated Blood Loss:  Minimal  Hemostasis Achieved:  by pressure    Procedural Pain:  0  / 10   Post Procedural Pain:  0 / 10     Response

## 2024-06-13 NOTE — PLAN OF CARE
Problem: Wound:  Goal: Will show signs of wound healing; wound closure and no evidence of infection  Description: Will show signs of wound healing; wound closure and no evidence of infection  Outcome: Progressing     
Detail Level: Detailed
Detail Level: Zone

## 2024-06-17 NOTE — DISCHARGE INSTRUCTIONS
Visit Discharge/Physician Orders     Discharge condition: Stable  Assessment of pain at discharge: yes  Anesthetic used: lidocaine 4%  Discharge to: Home  Left via:Private automobile  Accompanied by: accompanied by aunt  ECF/HHA: [facility] - NEW ORDERS     Dressing Orders:  Cleanse wound to Left hip and left thigh with normal saline, apply ALGINATE AG  to wound bed and cover with EXCEL SAP, change daily.           Treatment Orders:   EAT DIET WITH PROTEINS AND VITAMIN C  TAKE A MULTIVITAMIN DAILY IF NOT CONTRAINDICATED       [facility] followup visit ______1 week_______________________  (Please note your next appointment above and if you are unable to keep, kindly give a 24 hour notice. Thank you.)     Physician signature:__________________________        If you experience any of the following, please call the Wound Care Center during business hours:     * Increase in Pain  * Temperature over 101  * Increase in drainage from your wound  * Drainage with a foul odor  * Bleeding  * Increase in swelling  * Need for compression bandage changes due to slippage, breakthrough drainage.     If you need medical attention outside of the business hours of the Wound Care Centers please contact your PCP or go to the nearest emergency room.

## 2024-06-20 ENCOUNTER — HOSPITAL ENCOUNTER (OUTPATIENT)
Dept: WOUND CARE | Age: 64
Discharge: HOME | End: 2024-06-20
Payer: COMMERCIAL

## 2024-06-20 VITALS
BODY MASS INDEX: 39.99 KG/M2 | DIASTOLIC BLOOD PRESSURE: 72 MMHG | RESPIRATION RATE: 18 BRPM | WEIGHT: 240 LBS | HEIGHT: 65 IN | HEART RATE: 66 BPM | SYSTOLIC BLOOD PRESSURE: 161 MMHG | TEMPERATURE: 96.8 F

## 2024-06-20 DIAGNOSIS — T81.89XD NON-HEALING SURGICAL WOUND, SUBSEQUENT ENCOUNTER: Primary | ICD-10-CM

## 2024-06-20 PROCEDURE — 11042 DBRDMT SUBQ TIS 1ST 20SQCM/<: CPT

## 2024-06-20 RX ORDER — BACITRACIN ZINC AND POLYMYXIN B SULFATE 500; 1000 [USP'U]/G; [USP'U]/G
OINTMENT TOPICAL ONCE
OUTPATIENT
Start: 2024-06-20 | End: 2024-06-20

## 2024-06-20 RX ORDER — LIDOCAINE 40 MG/G
CREAM TOPICAL ONCE
OUTPATIENT
Start: 2024-06-20 | End: 2024-06-20

## 2024-06-20 RX ORDER — TRIAMCINOLONE ACETONIDE 1 MG/G
OINTMENT TOPICAL ONCE
OUTPATIENT
Start: 2024-06-20 | End: 2024-06-20

## 2024-06-20 RX ORDER — LIDOCAINE HYDROCHLORIDE 40 MG/ML
SOLUTION TOPICAL ONCE
Status: COMPLETED | OUTPATIENT
Start: 2024-06-20 | End: 2024-06-20

## 2024-06-20 RX ORDER — LIDOCAINE HYDROCHLORIDE 40 MG/ML
SOLUTION TOPICAL ONCE
OUTPATIENT
Start: 2024-06-20 | End: 2024-06-20

## 2024-06-20 RX ORDER — SODIUM CHLOR/HYPOCHLOROUS ACID 0.033 %
SOLUTION, IRRIGATION IRRIGATION ONCE
OUTPATIENT
Start: 2024-06-20 | End: 2024-06-20

## 2024-06-20 RX ORDER — CLOBETASOL PROPIONATE 0.5 MG/G
OINTMENT TOPICAL ONCE
OUTPATIENT
Start: 2024-06-20 | End: 2024-06-20

## 2024-06-20 RX ORDER — IBUPROFEN 200 MG
TABLET ORAL ONCE
OUTPATIENT
Start: 2024-06-20 | End: 2024-06-20

## 2024-06-20 RX ORDER — GENTAMICIN SULFATE 1 MG/G
OINTMENT TOPICAL ONCE
OUTPATIENT
Start: 2024-06-20 | End: 2024-06-20

## 2024-06-20 RX ORDER — BETAMETHASONE DIPROPIONATE 0.5 MG/G
CREAM TOPICAL ONCE
OUTPATIENT
Start: 2024-06-20 | End: 2024-06-20

## 2024-06-20 RX ORDER — GINSENG 100 MG
CAPSULE ORAL ONCE
OUTPATIENT
Start: 2024-06-20 | End: 2024-06-20

## 2024-06-20 RX ORDER — LIDOCAINE 50 MG/G
OINTMENT TOPICAL ONCE
OUTPATIENT
Start: 2024-06-20 | End: 2024-06-20

## 2024-06-20 RX ADMIN — LIDOCAINE HYDROCHLORIDE 5 ML: 40 SOLUTION TOPICAL at 09:09

## 2024-06-20 NOTE — PLAN OF CARE
Problem: Cognitive:  Goal: Knowledge of wound care  Description: Knowledge of wound care  Outcome: Progressing  Goal: Understands risk factors for wounds  Description: Understands risk factors for wounds  Outcome: Progressing     Problem: Wound:  Goal: Will show signs of wound healing; wound closure and no evidence of infection  Description: Will show signs of wound healing; wound closure and no evidence of infection  Outcome: Progressing     Problem: Wound:  Goal: Will show signs of wound healing; wound closure and no evidence of infection  Description: Will show signs of wound healing; wound closure and no evidence of infection  Outcome: Progressing

## 2024-06-20 NOTE — PROGRESS NOTES
Wound Healing % 60 06/20/24 0904   Post-Procedure Length (cm) 0.5 cm 06/20/24 0928   Post-Procedure Width (cm) 0.5 cm 06/20/24 0928   Post-Procedure Depth (cm) 0.2 cm 06/20/24 0928   Post-Procedure Surface Area (cm^2) 0.25 cm^2 06/20/24 0928   Post-Procedure Volume (cm^3) 0.05 cm^3 06/20/24 0928   Undermining Starts ___ O'Clock 7 06/13/24 0827   Undermining Ends___ O'Clock 2 06/13/24 0827   Undermining Maxium Distance (cm) .2 @ 11 06/13/24 0827   Wound Assessment Granulation tissue 06/20/24 0904   Drainage Amount Small (< 25%) 06/20/24 0904   Drainage Description Serosanguinous 06/20/24 0904   Odor None 06/20/24 0904   Chary-wound Assessment Blanchable erythema 06/20/24 0904   Margins Attached edges 05/02/24 1015   Wound Thickness Description not for Pressure Injury Full thickness 05/02/24 1015   Number of days: 49          Procedure Note  Indications:  Based on my examination of this patient's wound(s)/ulcer(s) today, debridement is required to promote healing and evaluate the wound base.    Performed by: Jayesh Blanco DO    Consent obtained:  Yes    Time out taken:  Yes    Pain Control: Anesthetic  Anesthetic: 4% Lidocaine Liquid Topical     Debridement:Excisional Debridement    Using curette the wound(s)/ulcer(s) was/were sharply debrided down through and including the removal of subcutaneous tissue.        Devitalized Tissue Debrided:  fibrin, slough, and exudate to stimulate bleeding to promote healing, post debridement good bleeding base and wound edges noted    Wound/Ulcer #: 1    Percent of Wound/Ulcer Debrided: 100%    Total Surface Area Debrided:  0.81sq cm (From 1.2sq cm (from 1.96sq cm (from 2.1sq cm (from 2.7sq cm (from 3.91sq cm (from 3.4 sq cm ) ) ) ) )      Wound/Ulcer #: 2    Percent of Wound/Ulcer Debrided: 100%    Total Surface Area Debrided:  0.16sq cm (from 0.6sq cm (from  0.66sq cm (from 0.5sq cm (from 0.04sq cm (from 0.06sq cm (from 0.09 sq cm ) ) ) ) ) )    Estimated Blood Loss:

## 2024-06-27 ENCOUNTER — HOSPITAL ENCOUNTER (OUTPATIENT)
Dept: WOUND CARE | Age: 64
Discharge: HOME | End: 2024-06-27
Payer: COMMERCIAL

## 2024-06-27 VITALS
SYSTOLIC BLOOD PRESSURE: 156 MMHG | DIASTOLIC BLOOD PRESSURE: 65 MMHG | TEMPERATURE: 96.4 F | HEART RATE: 78 BPM | RESPIRATION RATE: 18 BRPM

## 2024-06-27 DIAGNOSIS — T81.89XD NON-HEALING SURGICAL WOUND, SUBSEQUENT ENCOUNTER: Primary | ICD-10-CM

## 2024-06-27 PROCEDURE — 97597 DBRDMT OPN WND 1ST 20 CM/<: CPT | Performed by: SURGERY

## 2024-06-27 PROCEDURE — 97597 DBRDMT OPN WND 1ST 20 CM/<: CPT

## 2024-06-27 RX ORDER — LIDOCAINE HYDROCHLORIDE 40 MG/ML
SOLUTION TOPICAL ONCE
Status: COMPLETED | OUTPATIENT
Start: 2024-06-27 | End: 2024-06-27

## 2024-06-27 RX ORDER — GENTAMICIN SULFATE 1 MG/G
OINTMENT TOPICAL ONCE
OUTPATIENT
Start: 2024-06-27 | End: 2024-06-27

## 2024-06-27 RX ORDER — IBUPROFEN 200 MG
TABLET ORAL ONCE
OUTPATIENT
Start: 2024-06-27 | End: 2024-06-27

## 2024-06-27 RX ORDER — BACITRACIN ZINC AND POLYMYXIN B SULFATE 500; 1000 [USP'U]/G; [USP'U]/G
OINTMENT TOPICAL ONCE
OUTPATIENT
Start: 2024-06-27 | End: 2024-06-27

## 2024-06-27 RX ORDER — LIDOCAINE HYDROCHLORIDE 40 MG/ML
SOLUTION TOPICAL ONCE
OUTPATIENT
Start: 2024-06-27 | End: 2024-06-27

## 2024-06-27 RX ORDER — ATENOLOL 50 MG/1
50 TABLET ORAL DAILY
COMMUNITY

## 2024-06-27 RX ORDER — LIDOCAINE 50 MG/G
OINTMENT TOPICAL ONCE
OUTPATIENT
Start: 2024-06-27 | End: 2024-06-27

## 2024-06-27 RX ORDER — LIDOCAINE 40 MG/G
CREAM TOPICAL ONCE
OUTPATIENT
Start: 2024-06-27 | End: 2024-06-27

## 2024-06-27 RX ORDER — TRIAMCINOLONE ACETONIDE 1 MG/G
OINTMENT TOPICAL ONCE
OUTPATIENT
Start: 2024-06-27 | End: 2024-06-27

## 2024-06-27 RX ORDER — SODIUM CHLOR/HYPOCHLOROUS ACID 0.033 %
SOLUTION, IRRIGATION IRRIGATION ONCE
OUTPATIENT
Start: 2024-06-27 | End: 2024-06-27

## 2024-06-27 RX ORDER — CLOBETASOL PROPIONATE 0.5 MG/G
OINTMENT TOPICAL ONCE
OUTPATIENT
Start: 2024-06-27 | End: 2024-06-27

## 2024-06-27 RX ORDER — GINSENG 100 MG
CAPSULE ORAL ONCE
OUTPATIENT
Start: 2024-06-27 | End: 2024-06-27

## 2024-06-27 RX ORDER — BETAMETHASONE DIPROPIONATE 0.5 MG/G
CREAM TOPICAL ONCE
OUTPATIENT
Start: 2024-06-27 | End: 2024-06-27

## 2024-06-27 RX ADMIN — LIDOCAINE HYDROCHLORIDE 10 ML: 40 SOLUTION TOPICAL at 08:32

## 2024-06-27 NOTE — PROGRESS NOTES
Wound Healing Center Followup Visit Note    Referring Physician : [place], Jayesh FERGUSON MD  Jessica Shea  MEDICAL RECORD NUMBER:  [id number]  AGE: 64 y.o.   GENDER: female  : 1960  EPISODE DATE:  2024    Subjective:     Chief Complaint   Patient presents with    Wound Check     Left hip and thigh      HISTORY of PRESENT ILLNESS HPI   Jessica Shea is a 64 y.o. female who presents today in regards to follow up evaluation and treatment of wound/ulcer.  That patient's past medical, family and social hx were reviewed and changes were made if present.    History of Wound Context:  The patient has had a wound of nonhealing surgical wound which was first noted approximately 2024.  This has been treated jamar. On their initial visit to the wound healing center, 2024 ,  the patient has noted that the wound has not been improving.  The patient has not had similar previous wounds in the past.      Pt is on abx at time of initial visit.      2024  -counseled on diet, wound care   -all ?s answered  -full wt bearing, no issue according to pt, doing well in pt  -debridement, slough necrotic tissue. No apparent biofilm cx was sent  -alginate ag, no improvement this next week jamar (if home health done with pt at home) or vac  -no cardinal ss  -no constitutional ss    2024  -depth improved but surface area worsened slightly  -home health not packing cranial wound reportedly, it is 1cm in depth with .5mm width by .3mm width after debridement - it is certainly a wound that can/should be packed  -get approval for renasys vac 125mmHg 5 min and 50mmHg 2 min  -cx reviewed she was on appropriate abx, extend the course  -no cardinal ss  -no constitutional ss    2024  -overall, cranial and caudal wounds healing nicely  -caudal wound surface area appears worse but depth improved, healthy budding granulation tissue is present  -insurance issue with vac we are working through now  -no cardinal ss  -no

## 2024-06-27 NOTE — DISCHARGE INSTRUCTIONS
Visit Discharge/Physician Orders     Discharge condition: Stable  Assessment of pain at discharge: yes  Anesthetic used: lidocaine 4%  Discharge to: Home  Left via:Private automobile  Accompanied by: accompanied by aunt  ECF/HHA: [facility] - NEW ORDERS     Dressing Orders:  Cleanse wound to Left hip with normal saline, apply ALGINATE AG  to wound bed and cover with EXCEL SAP, change daily.    Left thigh healed        Treatment Orders:   EAT DIET WITH PROTEINS AND VITAMIN C  TAKE A MULTIVITAMIN DAILY IF NOT CONTRAINDICATED       [facility] followup visit ______1 week__________________  (Please note your next appointment above and if you are unable to keep, kindly give a 24 hour notice. Thank you.)     Physician signature:__________________________        If you experience any of the following, please call the Wound Care Center during business hours:     * Increase in Pain  * Temperature over 101  * Increase in drainage from your wound  * Drainage with a foul odor  * Bleeding  * Increase in swelling  * Need for compression bandage changes due to slippage, breakthrough drainage.     If you need medical attention outside of the business hours of the Wound Care Centers please contact your PCP or go to the nearest emergency room.

## 2024-06-27 NOTE — PLAN OF CARE
Problem: Wound:  Goal: Will show signs of wound healing; wound closure and no evidence of infection  Description: Will show signs of wound healing; wound closure and no evidence of infection  Outcome: Completed     Problem: Cognitive:  Goal: Knowledge of wound care  Description: Knowledge of wound care  Outcome: Completed  Goal: Understands risk factors for wounds  Description: Understands risk factors for wounds  Outcome: Completed     Problem: Wound:  Goal: Will show signs of wound healing; wound closure and no evidence of infection  Description: Will show signs of wound healing; wound closure and no evidence of infection  Outcome: Progressing

## 2024-07-09 NOTE — DISCHARGE INSTRUCTIONS
Visit Discharge/Physician Orders     Discharge condition: Stable  Assessment of pain at discharge: yes  Anesthetic used: lidocaine 4%  Discharge to: Home  Left via:Private automobile  Accompanied by: accompanied by aunt  ECF/HHA: [facility] - NEW ORDERS     Dressing Orders:  wounds healed           Treatment Orders:   EAT DIET WITH PROTEINS AND VITAMIN C  TAKE A MULTIVITAMIN DAILY IF NOT CONTRAINDICATED       [facility] followup visit ____discharged________________  (Please note your next appointment above and if you are unable to keep, kindly give a 24 hour notice. Thank you.)     Physician signature:__________________________        If you experience any of the following, please call the Wound Care Center during business hours:     * Increase in Pain  * Temperature over 101  * Increase in drainage from your wound  * Drainage with a foul odor  * Bleeding  * Increase in swelling  * Need for compression bandage changes due to slippage, breakthrough drainage.     If you need medical attention outside of the business hours of the Wound Care Centers please contact your PCP or go to the nearest emergency room.

## 2024-07-11 ENCOUNTER — HOSPITAL ENCOUNTER (OUTPATIENT)
Dept: WOUND CARE | Age: 64
Discharge: HOME | End: 2024-07-11
Payer: COMMERCIAL

## 2024-07-11 VITALS
TEMPERATURE: 96.1 F | SYSTOLIC BLOOD PRESSURE: 163 MMHG | WEIGHT: 240 LBS | HEART RATE: 78 BPM | DIASTOLIC BLOOD PRESSURE: 77 MMHG | HEIGHT: 65 IN | RESPIRATION RATE: 18 BRPM | BODY MASS INDEX: 39.99 KG/M2

## 2024-07-11 DIAGNOSIS — E03.9 HYPOTHYROIDISM, UNSPECIFIED TYPE: ICD-10-CM

## 2024-07-11 DIAGNOSIS — I10 PRIMARY HYPERTENSION: ICD-10-CM

## 2024-07-11 DIAGNOSIS — E66.01 CLASS 2 SEVERE OBESITY DUE TO EXCESS CALORIES WITH SERIOUS COMORBIDITY AND BODY MASS INDEX (BMI) OF 39.0 TO 39.9 IN ADULT (HCC): ICD-10-CM

## 2024-07-11 DIAGNOSIS — T81.89XD NON-HEALING SURGICAL WOUND, SUBSEQUENT ENCOUNTER: Primary | ICD-10-CM

## 2024-07-11 PROCEDURE — 99212 OFFICE O/P EST SF 10 MIN: CPT | Performed by: SURGERY

## 2024-07-11 PROCEDURE — 99211 OFF/OP EST MAY X REQ PHY/QHP: CPT

## 2024-07-11 NOTE — PROGRESS NOTES
Dressing/Treatment Alginate with Ag;Silicone border 06/27/24 0911   Offloading for Diabetic Foot Ulcers Offloading ordered 05/30/24 1046   Wound Length (cm) 0 cm 07/11/24 1031   Wound Width (cm) 0 cm 07/11/24 1031   Wound Depth (cm) 0 cm 07/11/24 1031   Wound Surface Area (cm^2) 0 cm^2 07/11/24 1031   Change in Wound Size % (l*w) 100 07/11/24 1031   Wound Volume (cm^3) 0 cm^3 07/11/24 1031   Wound Healing % 100 07/11/24 1031   Post-Procedure Length (cm) 0 cm 07/11/24 1114   Post-Procedure Width (cm) 0 cm 07/11/24 1114   Post-Procedure Depth (cm) 0 cm 07/11/24 1114   Post-Procedure Surface Area (cm^2) 0 cm^2 07/11/24 1114   Post-Procedure Volume (cm^3) 0 cm^3 07/11/24 1114   Distance Tunneling (cm) 0 cm 07/11/24 1114   Undermining Starts ___ O'Clock 7 06/13/24 0827   Undermining Ends___ O'Clock 2 06/13/24 0827   Undermining Maxium Distance (cm) .2 @ 11 06/13/24 0827   Wound Assessment Epithelialization 07/11/24 1031   Drainage Amount None (dry) 07/11/24 1031   Drainage Description Serosanguinous 06/27/24 0833   Odor None 06/27/24 0833   Chary-wound Assessment Blanchable erythema 06/27/24 0833   Margins Attached edges 05/02/24 1015   Wound Thickness Description not for Pressure Injury Full thickness 05/02/24 1015   Number of days: 70          Procedure Note  HEALED    Plan:   Treatment Note please see attached Discharge Instructions    Written patient dismissal instructions given to patient and signed by patient or POA.         HEALED    Discharge Instructions                          Visit Discharge/Physician Orders     Discharge condition: Stable  Assessment of pain at discharge: yes  Anesthetic used: lidocaine 4%  Discharge to: Home  Left via:Private automobile  Accompanied by: accompanied by aunt  ECF/HHA: [facility] - NEW ORDERS     Dressing Orders:  wounds healed           Treatment Orders:   EAT DIET WITH PROTEINS AND VITAMIN C  TAKE A MULTIVITAMIN DAILY IF NOT CONTRAINDICATED       [facility] followup visit